# Patient Record
Sex: FEMALE | Race: WHITE
[De-identification: names, ages, dates, MRNs, and addresses within clinical notes are randomized per-mention and may not be internally consistent; named-entity substitution may affect disease eponyms.]

---

## 2021-11-29 ENCOUNTER — HOSPITAL ENCOUNTER (OUTPATIENT)
Dept: HOSPITAL 76 - DI.N | Age: 56
Discharge: HOME | End: 2021-11-29
Attending: PHYSICIAN ASSISTANT
Payer: COMMERCIAL

## 2021-11-29 DIAGNOSIS — S86.911D: Primary | ICD-10-CM

## 2021-11-29 DIAGNOSIS — M17.11: ICD-10-CM

## 2021-11-29 NOTE — XRAY REPORT
PROCEDURE:  Knee 3 View RT

 

INDICATIONS:  STRAIN OF MUSCLES, AND TENDON OF R LOWER LEG

 

TECHNIQUE:  3 views of the right knee(s) were acquired.  

 

COMPARISON:  None.

 

FINDINGS:  

 

Bones:  No fractures or dislocations.  No suspicious bony lesions. Mild-to-moderate tricompartmental 
osteoarthritis.

 

Soft tissues:  No joint effusion.  No suspicious soft tissue calcifications.  

 

IMPRESSION:  Right knee mild-to-moderate tricompartmental osteoarthritis.

 

Reviewed by: Maureen Vela MD, PhD on 11/29/2021 4:06 PM PST

Approved by: Maureen Vela MD, PhD on 11/29/2021 4:06 PM PST

 

 

Station ID:  [id number]